# Patient Record
Sex: MALE | Race: OTHER | NOT HISPANIC OR LATINO | URBAN - METROPOLITAN AREA
[De-identification: names, ages, dates, MRNs, and addresses within clinical notes are randomized per-mention and may not be internally consistent; named-entity substitution may affect disease eponyms.]

---

## 2022-01-07 ENCOUNTER — OUT OF OFFICE VISIT (OUTPATIENT)
Dept: URBAN - METROPOLITAN AREA MEDICAL CENTER 28 | Facility: MEDICAL CENTER | Age: 37
End: 2022-01-07
Payer: COMMERCIAL

## 2022-01-07 DIAGNOSIS — R13.10 ABNORMAL DEGLUTITION: ICD-10-CM

## 2022-01-07 DIAGNOSIS — K22.89 ESOPHAGEAL BLEEDING: ICD-10-CM

## 2022-01-07 PROCEDURE — 43239 EGD BIOPSY SINGLE/MULTIPLE: CPT | Performed by: INTERNAL MEDICINE

## 2022-01-12 ENCOUNTER — WEB ENCOUNTER (OUTPATIENT)
Dept: URBAN - METROPOLITAN AREA CLINIC 92 | Facility: CLINIC | Age: 37
End: 2022-01-12

## 2022-02-08 ENCOUNTER — OFFICE VISIT (OUTPATIENT)
Dept: URBAN - METROPOLITAN AREA TELEHEALTH 2 | Facility: TELEHEALTH | Age: 37
End: 2022-02-08
Payer: COMMERCIAL

## 2022-02-08 DIAGNOSIS — T18.128D FOOD IMPACTION OF ESOPHAGUS, SUBSEQUENT ENCOUNTER: ICD-10-CM

## 2022-02-08 DIAGNOSIS — K20.90 ESOPHAGITIS: ICD-10-CM

## 2022-02-08 PROCEDURE — 99213 OFFICE O/P EST LOW 20 MIN: CPT | Performed by: INTERNAL MEDICINE

## 2022-02-08 RX ORDER — PANTOPRAZOLE SODIUM 40 MG/1
1 TABLET TABLET, DELAYED RELEASE ORAL
Qty: 135 TABLET | Refills: 0 | OUTPATIENT

## 2022-02-08 NOTE — HPI-TODAY'S VISIT:
36 y.o. WM Recurrent episodes where bottom of throat will be tight  Feels like food will be stuck and can't breath when it happens Had to come to ER last  month No eczema Mild asthma

## 2022-02-14 ENCOUNTER — TELEPHONE ENCOUNTER (OUTPATIENT)
Dept: URBAN - METROPOLITAN AREA CLINIC 96 | Facility: CLINIC | Age: 37
End: 2022-02-14

## 2022-05-03 ENCOUNTER — OFFICE VISIT (OUTPATIENT)
Dept: URBAN - METROPOLITAN AREA TELEHEALTH 2 | Facility: TELEHEALTH | Age: 37
End: 2022-05-03
Payer: COMMERCIAL

## 2022-05-03 ENCOUNTER — DASHBOARD ENCOUNTERS (OUTPATIENT)
Age: 37
End: 2022-05-03

## 2022-05-03 DIAGNOSIS — K20.0 EOSINOPHILIC ESOPHAGITIS: ICD-10-CM

## 2022-05-03 DIAGNOSIS — T18.128D FOOD IMPACTION OF ESOPHAGUS, SUBSEQUENT ENCOUNTER: ICD-10-CM

## 2022-05-03 PROBLEM — 235599003: Status: ACTIVE | Noted: 2022-02-08

## 2022-05-03 PROCEDURE — 99213 OFFICE O/P EST LOW 20 MIN: CPT | Performed by: INTERNAL MEDICINE

## 2022-05-03 RX ORDER — PANTOPRAZOLE SODIUM 20 MG/1
1 TABLET TABLET, DELAYED RELEASE ORAL ONCE A DAY
Qty: 90 TABLET | Refills: 3 | OUTPATIENT

## 2022-05-03 RX ORDER — PANTOPRAZOLE SODIUM 40 MG/1
1 TABLET TABLET, DELAYED RELEASE ORAL
Qty: 135 TABLET | Refills: 0 | Status: ACTIVE | COMMUNITY

## 2022-05-03 NOTE — HPI-TODAY'S VISIT:
Pt is feeling a lot better He thinks the medications helped a lot He is still on the Pantoprazole 40mg once a day now It has improved remarkably Very minimal trouble No heartburn or indigestion No abd pain

## 2022-07-06 ENCOUNTER — TELEPHONE ENCOUNTER (OUTPATIENT)
Dept: URBAN - METROPOLITAN AREA TELEHEALTH 2 | Facility: TELEHEALTH | Age: 37
End: 2022-07-06

## 2022-07-13 ENCOUNTER — TELEPHONE ENCOUNTER (OUTPATIENT)
Dept: URBAN - METROPOLITAN AREA CLINIC 96 | Facility: CLINIC | Age: 37
End: 2022-07-13

## 2022-07-13 RX ORDER — PANTOPRAZOLE SODIUM 20 MG/1
1 TABLET TABLET, DELAYED RELEASE ORAL ONCE A DAY
Qty: 90 TABLET | Refills: 1

## 2022-07-20 ENCOUNTER — TELEPHONE ENCOUNTER (OUTPATIENT)
Dept: URBAN - METROPOLITAN AREA CLINIC 96 | Facility: CLINIC | Age: 37
End: 2022-07-20

## 2024-12-05 ENCOUNTER — OFFICE VISIT (OUTPATIENT)
Dept: URBAN - METROPOLITAN AREA CLINIC 12 | Facility: CLINIC | Age: 39
End: 2024-12-05
Payer: COMMERCIAL

## 2024-12-05 VITALS
WEIGHT: 164.8 LBS | BODY MASS INDEX: 23.59 KG/M2 | SYSTOLIC BLOOD PRESSURE: 128 MMHG | HEART RATE: 72 BPM | DIASTOLIC BLOOD PRESSURE: 72 MMHG | HEIGHT: 70 IN | TEMPERATURE: 97.8 F

## 2024-12-05 DIAGNOSIS — N05.0 MCD (MINIMAL CHANGE DISEASE): ICD-10-CM

## 2024-12-05 DIAGNOSIS — R13.19 ESOPHAGEAL DYSPHAGIA: ICD-10-CM

## 2024-12-05 DIAGNOSIS — R14.0 ABDOMINAL BLOATING: ICD-10-CM

## 2024-12-05 DIAGNOSIS — K62.5 RECTAL BLEEDING: ICD-10-CM

## 2024-12-05 DIAGNOSIS — K20.0 EOSINOPHILIC ESOPHAGITIS: ICD-10-CM

## 2024-12-05 DIAGNOSIS — K59.09 OTHER CONSTIPATION: ICD-10-CM

## 2024-12-05 PROBLEM — 44785005: Status: ACTIVE | Noted: 2024-12-05

## 2024-12-05 PROBLEM — 116289008: Status: ACTIVE | Noted: 2024-12-05

## 2024-12-05 PROBLEM — 14760008: Status: ACTIVE | Noted: 2024-12-05

## 2024-12-05 PROBLEM — 88111009: Status: ACTIVE | Noted: 2024-12-05

## 2024-12-05 PROBLEM — 12063002: Status: ACTIVE | Noted: 2024-12-05

## 2024-12-05 PROCEDURE — 99214 OFFICE O/P EST MOD 30 MIN: CPT

## 2024-12-05 RX ORDER — PANTOPRAZOLE SODIUM 20 MG/1
1 TABLET TABLET, DELAYED RELEASE ORAL ONCE A DAY
Qty: 90 TABLET | Refills: 1 | Status: ACTIVE | COMMUNITY

## 2024-12-05 NOTE — HPI-TODAY'S VISIT:
Pt is a 40 yo male with hx of EoE presents today for change in bowel habits. He states he developed constipation 2 months ago, having BM 3 every 3-5 days with straining. Denies previous hx of constipation, used to have 1-2 regular BM daily prior to this onset. He has noticed bright red blood on toilet paper after BM a few times. Reports abdominal discomfort and bloating but no abd pain or rectal pain. He has tried Miralax, Ducolx and castor oil with minimal symptom relief. Denies FH of CRC. Had colonosocopy for diarrhea about 10 years ago wtihout significant findings.   He has hx of autoimmune minimal change disease and is takes 1mg prednisone daily, however had a recent flare up of his diseases and currently taking Prednisone 20mg with Pantoprazole 20mg daily. He reports intermittent dysphagia, especially with higher dose of prednisone, requiring PPI. Last EGD on 1/2022 revealing EoE. Denies frequent acid reflux or heartburn. Denies problems with heart or lungs. No trouble with anesthesia in the past.

## 2024-12-30 ENCOUNTER — TELEPHONE ENCOUNTER (OUTPATIENT)
Dept: URBAN - METROPOLITAN AREA CLINIC 23 | Facility: CLINIC | Age: 39
End: 2024-12-30

## 2024-12-30 ENCOUNTER — OFFICE VISIT (OUTPATIENT)
Dept: URBAN - METROPOLITAN AREA LAB 3 | Facility: LAB | Age: 39
End: 2024-12-30

## 2024-12-30 RX ORDER — PANTOPRAZOLE SODIUM 20 MG/1
1 TABLET TABLET, DELAYED RELEASE ORAL ONCE A DAY
Qty: 90 TABLET | Refills: 1 | Status: ACTIVE | COMMUNITY

## 2025-01-08 ENCOUNTER — TELEPHONE ENCOUNTER (OUTPATIENT)
Dept: URBAN - METROPOLITAN AREA CLINIC 23 | Facility: CLINIC | Age: 40
End: 2025-01-08

## 2025-01-13 ENCOUNTER — OFFICE VISIT (OUTPATIENT)
Dept: URBAN - METROPOLITAN AREA CLINIC 12 | Facility: CLINIC | Age: 40
End: 2025-01-13
Payer: COMMERCIAL

## 2025-01-13 ENCOUNTER — OFFICE VISIT (OUTPATIENT)
Dept: URBAN - METROPOLITAN AREA CLINIC 12 | Facility: CLINIC | Age: 40
End: 2025-01-13

## 2025-01-13 VITALS
WEIGHT: 165 LBS | HEART RATE: 60 BPM | TEMPERATURE: 97.7 F | HEIGHT: 70 IN | DIASTOLIC BLOOD PRESSURE: 79 MMHG | SYSTOLIC BLOOD PRESSURE: 129 MMHG | BODY MASS INDEX: 23.62 KG/M2

## 2025-01-13 DIAGNOSIS — R19.4 CHANGE IN BOWEL HABIT: ICD-10-CM

## 2025-01-13 DIAGNOSIS — R14.0 ABDOMINAL BLOATING: ICD-10-CM

## 2025-01-13 DIAGNOSIS — Z87.19 HISTORY OF GASTROESOPHAGEAL REFLUX (GERD): ICD-10-CM

## 2025-01-13 DIAGNOSIS — K59.09 OTHER CONSTIPATION: ICD-10-CM

## 2025-01-13 DIAGNOSIS — R13.19 ESOPHAGEAL DYSPHAGIA: ICD-10-CM

## 2025-01-13 DIAGNOSIS — K62.5 RECTAL BLEEDING: ICD-10-CM

## 2025-01-13 PROBLEM — 70861000119106: Status: ACTIVE | Noted: 2025-01-13

## 2025-01-13 PROBLEM — 40890009: Status: ACTIVE | Noted: 2025-01-13

## 2025-01-13 PROCEDURE — 99214 OFFICE O/P EST MOD 30 MIN: CPT

## 2025-01-13 RX ORDER — PANTOPRAZOLE SODIUM 20 MG/1
1 TABLET TABLET, DELAYED RELEASE ORAL ONCE A DAY
Qty: 90 TABLET | Refills: 1 | Status: ACTIVE | COMMUNITY

## 2025-01-13 NOTE — HPI-TODAY'S VISIT:
Pt is a 40 yo male presents today for egd/colon f/u.  He was seen by me on 12/5/24 for change in bowel habit, rectal bleeding, constipation and dysphagia.  Egd/colon on 12/30/24 done by Dr. Brice: Colon- normal, IH. Repeat in 10 years. EGD: ringed esophagus, benign-appearing mild stenosis-dilated. Esophageal bx: neg for IM or EoE. No celiac.   Today pt reports improvement in dysphagia since EGD with dilation.  He has history of acid reflux, particularly when initiating prednisone therapy, but reports no recent symptoms. Taking Pantoprazole 20mg daily with good symptom control.   Continues to experience intermittent constipation especially during travels.  Having BM every other day with some straining. Patient tried Dulcolax and MiraLax for 3 days without significant improvement.  Denies abdominal pain or rectal pain.